# Patient Record
Sex: MALE | Race: WHITE | Employment: FULL TIME | URBAN - METROPOLITAN AREA
[De-identification: names, ages, dates, MRNs, and addresses within clinical notes are randomized per-mention and may not be internally consistent; named-entity substitution may affect disease eponyms.]

---

## 2018-02-07 ENCOUNTER — HOSPITAL ENCOUNTER (OUTPATIENT)
Dept: PHYSICAL THERAPY | Age: 63
Discharge: HOME | End: 2018-02-07
Payer: OTHER GOVERNMENT

## 2018-02-07 PROCEDURE — 97161 PT EVAL LOW COMPLEX 20 MIN: CPT | Performed by: PHYSICAL THERAPIST

## 2018-02-07 NOTE — PROGRESS NOTES
[facility] PHYSICAL THERAPY - DAILY TREATMENT NOTE    Patient Name: Barby Phelps        Date: 2018  : 1955   yes Patient  Verified  Visit #:     Insurance: Payor: Lidia Mcneil / Plan: Carry Jose Maeryanfatemeh [employer] PPO / Product Type: Commerical /      In time: 320 Out time: 355   Total Treatment Time: 35     Medicare Time Tracking (below)   Total Timed Codes (min):  ba 1:1 Treatment Time:  ba     TREATMENT AREA =  Left shoulder pain [M25.512]    SUBJECTIVE  Pain Level (on 0 to 10 scale):  ie  / 10   Medication Changes/New allergies or changes in medical history, any new surgeries or procedures?    no  If yes, update Summary List   Subjective Functional Status/Changes:  []  No changes reported     See ie          OBJECTIVE           min Patient Education:  yes  Reviewed HEP   []  Progressed/Changed HEP based on: Other Objective/Functional Measures:    See ie     Post Treatment Pain Level (on 0 to 10) scale:   ie  / 10     ASSESSMENT  Assessment/Changes in Function:     See ie     []  See Progress Note/Recertification   Patient will continue to benefit from skilled PT services to modify and progress therapeutic interventions, address functional mobility deficits, address ROM deficits, address strength deficits, analyze and address soft tissue restrictions, analyze and cue movement patterns, analyze and modify body mechanics/ergonomics, assess and modify postural abnormalities and instruct in home and community integration to attain remaining goals. Progress toward goals / Updated goals:    See ie     PLAN  []  Upgrade activities as tolerated yes Continue plan of care   []  Discharge due to :    []  Other:      Therapist: Bryant Sebastian, PT    Date: 2018 Time: 3:55 PM     No future appointments.

## 2018-02-07 NOTE — PROGRESS NOTES
[facility] PHYSICAL THERAPY  [address] [place] [address], [address] [facility] - Phone: [phone]  Fax: [phone] / 0049 [facility]  Patient Name: Anusha Lewis : 1955   Medical   Diagnosis: L shoulder impingment Treatment Diagnosis: Left shoulder pain [M25.512]   Onset Date:      Referral Source: Shantelle Harris MD Start of Care [facility] [place]): 2018   Prior Hospitalization: See medical history Provider #: [id number]   Prior Level of Function: Progressive limitation in shoulder use   Comorbidities: High blood pressure   Medications: Verified on Patient Summary List   The Plan of Care and following information is based on the information from the initial evaluation.   ===========================================================================================  Assessment / key information:  65 RHD M arrives to clinic with c/o L anterior/lateral shoulder pain after lifting a car transmission in early . Previous treatment has included NSAIDs, PT and 3 cortisone injections over past year (most recent improved s/s). Currently rates pain 8/10 at worst with overhead motions and 1/10 at best with rest. Objective findings: cervical screen +UT and LS tightness as well as elevation L first rib, shoulder arom/prom flex 108P1/122P!, abd 88P!/110P!, er 9/12P!, IR L5 spinal segment, reduced wrist supination by 45 degrees, +full can, speeds, cross over and post cap tightness. Strength is reduced in all directions. ttp throughout SAS, LHB and posterior capsule.  S/s consistent with shoulder impingement and will attempt PT in order to address problem list below.   ===========================================================================================  Eval Complexity: History MEDIUM  Complexity : 1-2 comorbidities / personal factors will impact the outcome/ POC ;  Examination  HIGH Complexity : 4+ Standardized tests and measures addressing body structure, function, activity limitation and / or participation in recreation ; Presentation LOW Complexity : Stable, uncomplicated ;  Decision Making MEDIUM Complexity : FOTO score of 26-74; Overall Complexity LOW   Problem List: pain affecting function, decrease ROM, decrease strength, decrease ADL/ functional abilitiies, decrease activity tolerance, decrease flexibility/ joint mobility and decrease transfer abilities   Treatment Plan may include any combination of the following: Therapeutic exercise, Therapeutic activities, Neuromuscular re-education, Physical agent/modality, Manual therapy, Patient education and Self Care training  Patient / Family readiness to learn indicated by: asking questions, trying to perform skills and interest  Persons(s) to be included in education: patient (P)  Barriers to Learning/Limitations: None  Measures taken:    Patient Goal (s): Decrease pain, restore use of shoulder    Patient self reported health status: good  Rehabilitation Potential: good   Short Term Goals: To be accomplished in  2  weeks:  1. Pt will be compliant with hep  2. Pt will increase shoulder er by 10 degrees to A with reaching  3. Pt will note daily max pain </=7/10 in order to increase participation in [address]: To be accomplished in  4  weeks:  1. Pt will increase shoulder elevation to 120 (without compensation) to A with reaching  2. Pt will increase FOTO by 12 points in order to show functional improvement  3.  Pt will note daily max pain </=4/10 in order to increase participation in adls  Frequency / Duration:   Patient to be seen  2-3  times per week for 4  weeks:  Patient / Caregiver education and instruction: self care and activity modification  G-Codes (GP): johnnie  Therapist Signature: Angi Arguelles PT, DPT, CMT  Date: 4/0/7252   Certification Period: johnnie Time: 3:56 PM ===========================================================================================  I certify that the above Physical Therapy Services are being furnished while the patient is under my care. I agree with the treatment plan and certify that this therapy is necessary. Physician Signature:        Date:       Time:     Please sign and return to [facility]. or you may fax the signed copy to [phone]. Thank you.

## 2018-02-09 ENCOUNTER — APPOINTMENT (OUTPATIENT)
Dept: PHYSICAL THERAPY | Age: 63
End: 2018-02-09
Payer: OTHER GOVERNMENT

## 2018-02-13 ENCOUNTER — HOSPITAL ENCOUNTER (OUTPATIENT)
Dept: PHYSICAL THERAPY | Age: 63
Discharge: HOME | End: 2018-02-13
Payer: OTHER GOVERNMENT

## 2018-02-13 PROCEDURE — 97110 THERAPEUTIC EXERCISES: CPT | Performed by: PHYSICAL THERAPIST

## 2018-02-13 PROCEDURE — 97140 MANUAL THERAPY 1/> REGIONS: CPT | Performed by: PHYSICAL THERAPIST

## 2018-02-13 NOTE — PROGRESS NOTES
[facility] PHYSICAL THERAPY - DAILY TREATMENT NOTE    Patient Name: Severa Landry        Date: 2018  : 1955   yes Patient  Verified  Visit #:     Insurance: Payor: Joseaileen Reyez / Plan: Carol Inks / Product Type: Commerical /      In time: 200 Out time: 300   Total Treatment Time: 55     Medicare Time Tracking (below)   Total Timed Codes (min):  na 1:1 Treatment Time:  na     TREATMENT AREA =  Left shoulder pain [M25.512]    SUBJECTIVE  Pain Level (on 0 to 10 scale):    / 10   Medication Changes/New allergies or changes in medical history, any new surgeries or procedures?    no  If yes, update Summary List   Subjective Functional Status/Changes:  []  No changes reported     I was sore for 5 days after the IE.           OBJECTIVE  Modalities Rationale:     decrease inflammation, decrease pain and increase tissue extensibility to improve patient's ability to complete adls   min [] Estim, type/location:                                      []  att     []  unatt     []  w/US     []  w/ice    []  w/heat    min []  Mechanical Traction: type/lbs                   []  pro   []  sup   []  int   []  cont    []  before manual    []  after manual    min []  Ultrasound, settings/location:      min []  Iontophoresis w/ dexamethasone, location:                                               []  take home patch       []  in clinic   10 min [x]  Ice     [x]  Heat    location/position: Long sit    min []  Vasopneumatic Device, press/temp:     min []  Other:    [x] Skin assessment post-treatment (if applicable):    [x]  intact    []  redness- no adverse reaction     []redness  adverse reaction:        15 min Therapeutic Exercise:  [x]  See flow sheet   Rationale:      increase ROM and improve coordination to improve the patients ability to complete adls     20 min Manual Therapy: ghj prom all directions, grade iii post mob, cfm/stretch lhb, dtm post cuff/ut   Rationale:      decrease pain, increase ROM, increase tissue extensibility and decrease trigger points to improve patient's ability to complete adls         min Patient Education:  yes  Reviewed HEP   []  Progressed/Changed HEP based on: Other Objective/Functional Measures:    Significant tenderness with all prom/aarom in all er and ir along anterior aspect of shoulder  Required cues for all te to maintain neutral spine - once cued able to achieve      Post Treatment Pain Level (on 0 to 10) scale:   2  / 10     ASSESSMENT  Assessment/Changes in Function:     No increase in pain following initial te but advised to increase icing based on response from IE      []  See Progress Note/Recertification   Patient will continue to benefit from skilled PT services to modify and progress therapeutic interventions, address functional mobility deficits, address ROM deficits, address strength deficits, analyze and address soft tissue restrictions, analyze and cue movement patterns, analyze and modify body mechanics/ergonomics, assess and modify postural abnormalities and instruct in home and community integration to attain remaining goals.    Progress toward goals / Updated goals:    Compliant with initial hep     PLAN  []  Upgrade activities as tolerated yes Continue plan of care   []  Discharge due to :    []  Other:      Therapist: Neno Serrato PT    Date: 2/13/2018 Time: 11:39 AM     Future Appointments  Date Time Provider Shavonne Mortensen   2/13/2018 2:30 PM Neno Serrato PT [facility]   2/15/2018 9:30 AM Neno Serrato PT [facility]   2/19/2018 2:30 PM Neno Serrato PT [facility]   2/21/2018 10:30 AM Neno Serrato PT [facility]   3/2/2018 9:00 AM Neno Serrato PT [facility]   3/5/2018 3:00 PM Neno Serrato PT [facility]   3/7/2018 3:00 PM Neno Serrato PT [facility]   3/9/2018 11:00 AM Neno Srerato PT [facility]

## 2018-02-15 ENCOUNTER — HOSPITAL ENCOUNTER (OUTPATIENT)
Dept: PHYSICAL THERAPY | Age: 63
Discharge: HOME | End: 2018-02-15
Payer: OTHER GOVERNMENT

## 2018-02-15 PROCEDURE — 97110 THERAPEUTIC EXERCISES: CPT | Performed by: PHYSICAL THERAPIST

## 2018-02-15 PROCEDURE — 97140 MANUAL THERAPY 1/> REGIONS: CPT | Performed by: PHYSICAL THERAPIST

## 2018-02-15 NOTE — PROGRESS NOTES
[facility]ario [facility]   PHYSICAL THERAPY - DAILY TREATMENT NOTE    Patient Name: Baljinder Power        Date: 2/15/2018  : 1955   yes Patient  Verified  Visit #:   3   of   12  Insurance: Payor: Odalis Aguirre / Plan: Christina Mosquera / Product Type: Commerical /      In time: 124 Out time: 1020   Total Treatment Time: 55     Medicare Time Tracking (below)   Total Timed Codes (min):  na 1:1 Treatment Time:  na     TREATMENT AREA =  Left shoulder pain [M25.512]    SUBJECTIVE  Pain Level (on 0 to 10 scale):  2  / 10   Medication Changes/New allergies or changes in medical history, any new surgeries or procedures?    no  If yes, update Summary List   Subjective Functional Status/Changes:  []  No changes reported     I was not too sore after last time much better than the IE        OBJECTIVE  Modalities Rationale:     decrease inflammation, decrease pain and increase tissue extensibility to improve patient's ability to complete adls   min [] Estim, type/location:                                      []  att     []  unatt     []  w/US     []  w/ice    []  w/heat    min []  Mechanical Traction: type/lbs                   []  pro   []  sup   []  int   []  cont    []  before manual    []  after manual    min []  Ultrasound, settings/location:      min []  Iontophoresis w/ dexamethasone, location:                                               []  take home patch       []  in clinic   10 min [x]  Ice     [x]  Heat    location/position: Long sit    min []  Vasopneumatic Device, press/temp:     min []  Other:    [x] Skin assessment post-treatment (if applicable):    [x]  intact    []  redness- no adverse reaction     []redness  adverse reaction:        13 min Therapeutic Exercise:  [x]  See flow sheet   Rationale:      increase ROM and improve coordination to improve the patients ability to complete adls     22 min Manual Therapy: ghj prom all directions, grade iii post mob, cfm/stretch lhb, dtm post cuff and pec major   Rationale:      decrease pain, increase ROM, increase tissue extensibility and decrease trigger points to improve patient's ability to complete adls         min Patient Education:  yes  Reviewed HEP   []  Progressed/Changed HEP based on: Other Objective/Functional Measures:  Er prom to 30 degrees before painful end feel, decreased soft tissue restrictions along biceps tendon with ability to achieve full wrist supination during stretch     Post Treatment Pain Level (on 0 to 10) scale:   1  / 10     ASSESSMENT  Assessment/Changes in Function:   Reaching > shoulder height is still pt chief c/o - pain is anterior aspect of shoulder and increases with repetitive use       []  See Progress Note/Recertification   Patient will continue to benefit from skilled PT services to modify and progress therapeutic interventions, address functional mobility deficits, address ROM deficits, address strength deficits, analyze and address soft tissue restrictions, analyze and cue movement patterns, analyze and modify body mechanics/ergonomics, assess and modify postural abnormalities and instruct in home and community integration to attain remaining goals.    Progress toward goals / Updated goals:  Progress towards er stg        PLAN  []  Upgrade activities as tolerated yes Continue plan of care   []  Discharge due to :    []  Other:      Therapist: Bam Lora PT    Date: 2/15/2018 Time: 11:39 AM     Future Appointments  Date Time Provider Shavonne Mortensen   2/15/2018 9:30 AM Bam Lora PT [facility]   2/19/2018 2:30 PM Bam Lora PT [facility]   2/21/2018 10:30 AM Bam Lora PT [facility]   3/2/2018 9:00 AM Bam Lora PT [facility]   3/5/2018 3:00 PM Bam Lora PT [facility]   3/7/2018 3:00 PM Bam Lora PT [facility]   3/9/2018 11:00 AM Bam Lora PT [facility]

## 2018-02-19 ENCOUNTER — HOSPITAL ENCOUNTER (OUTPATIENT)
Dept: PHYSICAL THERAPY | Age: 63
Discharge: HOME | End: 2018-02-19
Payer: OTHER GOVERNMENT

## 2018-02-19 PROCEDURE — 97110 THERAPEUTIC EXERCISES: CPT | Performed by: PHYSICAL THERAPIST

## 2018-02-19 PROCEDURE — 97140 MANUAL THERAPY 1/> REGIONS: CPT | Performed by: PHYSICAL THERAPIST

## 2018-02-19 NOTE — PROGRESS NOTES
[facility] PHYSICAL THERAPY - DAILY TREATMENT NOTE    Patient Name: Baljinder Power        Date: 2018  : 1955   yes Patient  Verified  Visit #:     Insurance: Payor: Odalis Aguirre / Plan: Christina Mosquera / Product Type: Commerical /      In time: 219 Out time: 323   Total Treatment Time: 60     Medicare Time Tracking (below)   Total Timed Codes (min):  na 1:1 Treatment Time:  na     TREATMENT AREA =  Left shoulder pain [M25.512]    SUBJECTIVE  Pain Level (on 0 to 10 scale):  2  / 10   Medication Changes/New allergies or changes in medical history, any new surgeries or procedures?    no  If yes, update Summary List   Subjective Functional Status/Changes:  []  No changes reported     The pain is pretty much close to nothing.  I feel like my biggest limitation now is my mobility      OBJECTIVE  Modalities Rationale:     decrease inflammation, decrease pain and increase tissue extensibility to improve patient's ability to complete adls   min [] Estim, type/location:                                      []  att     []  unatt     []  w/US     []  w/ice    []  w/heat    min []  Mechanical Traction: type/lbs                   []  pro   []  sup   []  int   []  cont    []  before manual    []  after manual    min []  Ultrasound, settings/location:      min []  Iontophoresis w/ dexamethasone, location:                                               []  take home patch       []  in clinic   10 min [x]  Ice     [x]  Heat    location/position: Long sit    min []  Vasopneumatic Device, press/temp:     min []  Other:    [x] Skin assessment post-treatment (if applicable):    [x]  intact    []  redness- no adverse reaction     []redness  adverse reaction:        18 min Therapeutic Exercise:  [x]  See flow sheet   Rationale:      increase ROM and improve coordination to improve the patients ability to complete adls     22 min Manual Therapy: ghj prom all directions, grade iii post mob, cfm/stretch lhb, dtm post cuff and pec major Rationale:      decrease pain, increase ROM, increase tissue extensibility and decrease trigger points to improve patient's ability to complete adls         min Patient Education:  yes  Reviewed HEP   []  Progressed/Changed HEP based on: Other Objective/Functional Measures:  Ir t12 spinal level, flex 112 without hike,   Improved lhb extensibility with pt able to achieve ull elbow ext and wrist pronation with stretch     Post Treatment Pain Level (on 0 to 10) scale:   0  / 10     ASSESSMENT  Assessment/Changes in Function:     Progressed te without any increase in pain - pt advised on DOMS and to continue with ice accordingly      []  See Progress Note/Recertification   Patient will continue to benefit from skilled PT services to modify and progress therapeutic interventions, address functional mobility deficits, address ROM deficits, address strength deficits, analyze and address soft tissue restrictions, analyze and cue movement patterns, analyze and modify body mechanics/ergonomics, assess and modify postural abnormalities and instruct in home and community integration to attain remaining goals. Progress toward goals / Updated goals:  Steady gains towards rom and pain goals.  Will perform functional scale next session in order to assess progress towards established goal      PLAN  []  Upgrade activities as tolerated yes Continue plan of care   []  Discharge due to :    []  Other:      Therapist: Bianca Masterson PT    Date: 2/19/2018 Time: 11:39 AM     Future Appointments  Date Time Provider Shavonne Mortensen   2/19/2018 2:30 PM Bianca Masterson PT [facility]   2/21/2018 10:30 AM Bianca Masterson PT [facility]   3/2/2018 9:00 AM Bianca Masterson PT [facility]   3/5/2018 3:00 PM Bianca Masterson PT [facility]   3/7/2018 3:00 PM Bianca Masterson PT [facility]   3/9/2018 11:00 AM Bianca Masterson PT [address]

## 2018-02-21 ENCOUNTER — HOSPITAL ENCOUNTER (OUTPATIENT)
Dept: PHYSICAL THERAPY | Age: 63
Discharge: HOME | End: 2018-02-21
Payer: OTHER GOVERNMENT

## 2018-02-21 PROCEDURE — 97140 MANUAL THERAPY 1/> REGIONS: CPT | Performed by: PHYSICAL THERAPIST

## 2018-02-21 PROCEDURE — 97110 THERAPEUTIC EXERCISES: CPT | Performed by: PHYSICAL THERAPIST

## 2018-02-21 NOTE — PROGRESS NOTES
[facility] PHYSICAL THERAPY - DAILY TREATMENT NOTE    Patient Name: Ankit Sanders        Date: 2018  : 1955   yes Patient  Verified  Visit #:     Insurance: Payor: Abel Plaza / Plan: Tiffanie Marvin / Product Type: Commerical /      In time:  Out time: 110   Total Treatment Time: 50     Medicare Time Tracking (below)   Total Timed Codes (min):  na 1:1 Treatment Time:  na     TREATMENT AREA =  Left shoulder pain [M25.512]    SUBJECTIVE  Pain Level (on 0 to 10 scale):  0  / 10   Medication Changes/New allergies or changes in medical history, any new surgeries or procedures?    no  If yes, update Summary List   Subjective Functional Status/Changes:  []  No changes reported     See pn     OBJECTIVE      30 min Therapeutic Exercise:  [x]  See flow sheet   Rationale:      increase ROM and improve coordination to improve the patients ability to complete adls     20 min Manual Therapy: ghj prom all directions, grade iii post mob, cfm/stretch lhb, dtm post cuff and pec major   Rationale:      decrease pain, increase ROM, increase tissue extensibility and decrease trigger points to improve patient's ability to complete adls         min Patient Education:  yes  Reviewed HEP   []  Progressed/Changed HEP based on: Other Objective/Functional Measures:  See pn     Post Treatment Pain Level (on 0 to 10) scale:   0  / 10     ASSESSMENT  Assessment/Changes in Function:     See pn     []  See Progress Note/Recertification   Patient will continue to benefit from skilled PT services to modify and progress therapeutic interventions, address functional mobility deficits, address ROM deficits, address strength deficits, analyze and address soft tissue restrictions, analyze and cue movement patterns, analyze and modify body mechanics/ergonomics, assess and modify postural abnormalities and instruct in home and community integration to attain remaining goals.    Progress toward goals / Updated goals:  See pn     PLAN  [] Upgrade activities as tolerated yes Continue plan of care   []  Discharge due to :    []  Other:      Therapist: Paul Segura PT    Date: 2/21/2018 Time: 11:39 AM     Future Appointments  Date Time Provider Shavonne Mortensen   3/2/2018 9:00 AM Paul Segura PT [address]   3/5/2018 3:00 PM Paul Segura, PT [place] [address]   3/7/2018 3:00 PM Paul Segura PT [address]   3/9/2018 11:00 AM Paul Segura, PT [facility]

## 2018-02-21 NOTE — PROGRESS NOTES
.[facility]A [facility]  PHYSICAL THERAPY  [address], [place] [address],[facility], [address] [facility] - Phone: [phone]  Fax: [phone]  PROGRESS NOTE  Patient Name: Remington Izquierdo : 1955   Treatment/Medical Diagnosis: Left shoulder pain [M25.512]   Referral Source: Nakia Sparks MD     Date of Initial Visit: 18 Attended Visits: 5 Missed Visits: 0     SUMMARY OF TREATMENT  Pt was seen for IE and 4 f/u sessions with treatment consisting of therapeutic exercises for shoulder rom/strength and roxy-scapular stability, manual therapy (prom/mobs/stm) and modalities prn. In addition pt was instructed on hep. CURRENT STATUS  Pt has made excellent progress with PT in just 5 sessions. Shoulder arom flex 142 (without hike), scaption 150, er 31 and IR l1 spinal segment. Max \"pain\" 2/10 along posterior lateral aspect of shoulder with overhead use. At this time strength and rotational adls are pt chief limitation. Would like to continue with therapy an additional month to address this    Goal/Measure of Progress Goal Met? 1. Pt will increase shoulder elevation >120 to A with reaching   Status at last Eval: Flex 108, scaption 122 Current Status: See above yes   2. Pt will increase FOTO by 12 points in order to show functional improvement   Status at last Eval: 47/100 Current Status: 55/100 progressing   3. Pt will decrease daily max pain </=4/10 in order to increase participation in adls   Status at last Eval: 8/10 Current Status: 2/10 yes     New Goals to be achieved in __4__  weeks:  1. Achieve goal #1  2. Achieve goal #2  3. Pt will increase shoulder er >45 degrees to A with reaching   RECOMMENDATIONS  Continue therapy an additional 2x/4weeks  If you have any questions/comments please contact us directly at [phone]. Thank you for allowing us to assist in the care of your patient.     Therapist Signature: Balbina Vail, PT, DPT, CMT Date: 2018     Time: 1:05 PM   NOTE TO PHYSICIAN:  PLEASE COMPLETE THE ORDERS BELOW AND FAX TO   [facility] Physical Therapy: [phone]  If you are unable to process this request in 24 hours please contact our office: [phone]    ___ I have read the above report and request that my patient continue as recommended.   ___ I have read the above report and request that my patient continue therapy with the following changes/special instructions:_________________________________________________________   ___ I have read the above report and request that my patient be discharged from therapy.      Physician Signature:        Date:       Time:

## 2018-03-02 ENCOUNTER — HOSPITAL ENCOUNTER (OUTPATIENT)
Dept: PHYSICAL THERAPY | Age: 63
Discharge: HOME | End: 2018-03-02
Payer: OTHER GOVERNMENT

## 2018-03-02 PROCEDURE — 97140 MANUAL THERAPY 1/> REGIONS: CPT | Performed by: PHYSICAL THERAPIST

## 2018-03-02 PROCEDURE — 97110 THERAPEUTIC EXERCISES: CPT | Performed by: PHYSICAL THERAPIST

## 2018-03-02 NOTE — PROGRESS NOTES
[facility] PHYSICAL THERAPY - DAILY TREATMENT NOTE    Patient Name: Janell Ochoa        Date: 3/2/2018  : 1955   yes Patient  Verified  Visit #:     Insurance: Payor: María Vital / Plan: Earle Paiz / Product Type: Commerical /      In time: 900 Out time: 375   Total Treatment Time: 42     Medicare Time Tracking (below)   Total Timed Codes (min):  na 1:1 Treatment Time:  na     TREATMENT AREA =  Left shoulder pain [M25.512]    SUBJECTIVE  Pain Level (on 0 to 10 scale):  0  / 10   Medication Changes/New allergies or changes in medical history, any new surgeries or procedures?    no  If yes, update Summary List   Subjective Functional Status/Changes:  []  No changes reported   I am feeling awesome - went on my vacation and just about the only thing I really could not do was lift some heavy things     OBJECTIVE      25 min Therapeutic Exercise:  [x]  See flow sheet   Rationale:      increase ROM and improve coordination to improve the patients ability to complete adls     17 min Manual Therapy: ghj prom all directions, grade iii post mob, cfm/stretch lhb, dtm post cuff and pec major   Rationale:      decrease pain, increase ROM, increase tissue extensibility and decrease trigger points to improve patient's ability to complete adls         min Patient Education:  yes  Reviewed HEP   []  Progressed/Changed HEP based on:         Other Objective/Functional Measures:  Minimal tenderness and restrictions along LHB, still continues with reduced posterior capsular mobility  Required cues for prone y/t for scapular activation      Post Treatment Pain Level (on 0 to 10) scale:   0  / 10     ASSESSMENT  Assessment/Changes in Function:     No increase in pain reported with progression of program      []  See Progress Note/Recertification   Patient will continue to benefit from skilled PT services to modify and progress therapeutic interventions, address functional mobility deficits, address ROM deficits, address strength deficits, analyze and address soft tissue restrictions, analyze and cue movement patterns, analyze and modify body mechanics/ergonomics, assess and modify postural abnormalities and instruct in home and community integration to attain remaining goals. Progress toward goals / Updated goals: Max pain during vacation 1/10 with swimming.  Chief c/o at this time is inability to lift any objects overhead or out to side      PLAN  []  Upgrade activities as tolerated yes Continue plan of care   []  Discharge due to :    []  Other:      Therapist: Bishop Rinne, PT    Date: 3/2/2018 Time: 11:39 AM     Future Appointments  Date Time Provider Shavonne Mortensen   3/5/2018 3:00 PM Bishop Rinne, PT [facility]   3/7/2018 3:00 PM Bishop Rinne, PT [facility]   3/9/2018 11:00 AM Bishop Rinne, PT [facility]

## 2018-03-05 ENCOUNTER — APPOINTMENT (OUTPATIENT)
Dept: PHYSICAL THERAPY | Age: 63
End: 2018-03-05
Payer: OTHER GOVERNMENT

## 2018-03-07 ENCOUNTER — HOSPITAL ENCOUNTER (OUTPATIENT)
Dept: PHYSICAL THERAPY | Age: 63
Discharge: HOME | End: 2018-03-07
Payer: OTHER GOVERNMENT

## 2018-03-07 PROCEDURE — 97110 THERAPEUTIC EXERCISES: CPT | Performed by: PHYSICAL THERAPIST

## 2018-03-07 PROCEDURE — 97140 MANUAL THERAPY 1/> REGIONS: CPT | Performed by: PHYSICAL THERAPIST

## 2018-03-07 NOTE — PROGRESS NOTES
[facility] PHYSICAL THERAPY - DAILY TREATMENT NOTE    Patient Name: Olivier Vidal        Date: 3/7/2018  : 1955   yes Patient  Verified  Visit #:     Insurance: Payor: Casimir Fleischer / Plan: William Boyle / Product Type: Commerical /      In time: 303 Out time: 358   Total Treatment Time: 54     Medicare Time Tracking (below)   Total Timed Codes (min):  na 1:1 Treatment Time:  na     TREATMENT AREA =  Left shoulder pain [M25.512]    SUBJECTIVE  Pain Level (on 0 to 10 scale):  0  / 10   Medication Changes/New allergies or changes in medical history, any new surgeries or procedures?    no  If yes, update Summary List   Subjective Functional Status/Changes:  []  No changes reported   Only time I have pain is this sweet spot in the front of my shoulder when I reach in a certain direction - after that it goes way      OBJECTIVE      30 min Therapeutic Exercise:  [x]  See flow sheet   Rationale:      increase ROM and improve coordination to improve the patients ability to complete adls     17 min Manual Therapy: ghj prom all directions, grade iii post mob, cfm/stretch lhb, dtm post cuff and pec major   Rationale:      decrease pain, increase ROM, increase tissue extensibility and decrease trigger points to improve patient's ability to complete adls         min Patient Education:  yes  Reviewed HEP   []  Progressed/Changed HEP based on: Other Objective/Functional Measures:     ttp along anterior aspect of shoulder along lhb.  Still continues with painful arc - increased shoulder stab te as per flow sheet to improve humeral head stabilization during motion   Post Treatment Pain Level (on 0 to 10) scale:   1  / 10     ASSESSMENT  Assessment/Changes in Function:   Reported slight increase in pain following exercise progression - had to leave early but advised to increase icing frequency this evening     []  See Progress Note/Recertification   Patient will continue to benefit from skilled PT services to modify and progress therapeutic interventions, address functional mobility deficits, address ROM deficits, address strength deficits, analyze and address soft tissue restrictions, analyze and cue movement patterns, analyze and modify body mechanics/ergonomics, assess and modify postural abnormalities and instruct in home and community integration to attain remaining goals.    Progress toward goals / Updated goals:  ltg # 3 achieved this session - will assess carry over next session     PLAN  []  Upgrade activities as tolerated yes Continue plan of care   []  Discharge due to :    []  Other:      Therapist: Maria Isabel Eden PT    Date: 3/7/2018 Time: 11:39 AM     Future Appointments  Date Time Provider Shavonne Mortensen   3/7/2018 3:00 PM CASSANDRA Alvarez [facility]   3/9/2018 11:00 AM Maria Isabel Eden PT [facility]

## 2018-03-09 ENCOUNTER — APPOINTMENT (OUTPATIENT)
Dept: PHYSICAL THERAPY | Age: 63
End: 2018-03-09
Payer: OTHER GOVERNMENT

## 2018-04-04 ENCOUNTER — HOSPITAL ENCOUNTER (OUTPATIENT)
Dept: PHYSICAL THERAPY | Age: 63
Discharge: HOME | End: 2018-04-04
Payer: OTHER GOVERNMENT

## 2018-04-04 PROCEDURE — 97140 MANUAL THERAPY 1/> REGIONS: CPT | Performed by: PHYSICAL THERAPIST

## 2018-04-04 PROCEDURE — 97110 THERAPEUTIC EXERCISES: CPT | Performed by: PHYSICAL THERAPIST

## 2018-04-04 NOTE — PROGRESS NOTES
.DALILA [facility]errin  PHYSICAL THERAPY  [address], [place] [address],[facility], [address] [facility] - Phone: [phone]  Fax: [phone]  PROGRESS NOTE  Patient Name: Jhonny Ragsdale : 1955   Treatment/Medical Diagnosis: Left shoulder pain [M25.512]   Referral Source: Herman Morrison MD     Date of Initial Visit: 18 Attended Visits: 8 Missed Visits: See below     SUMMARY OF TREATMENT  Pt was seen for IE and 7 f/u sessions with treatment consisting of therapeutic exercises for shoulder rom/strength and roxy-scapular stability, manual therapy (prom/mobs/stm) and modalities prn. In addition pt was instructed on hep. CURRENT STATUS  Pt has not been seen since 3/7/18. He has returned after f/u from your office requesting more visits to improve strength/endurance. During this time period he has been participating in adls such as lifting and pain with 2/10 max pain. Shoulder arom flex 163 (without hike), scaption 150, er 51 and IR t11 spinal segment. He is still unable to sustain shoulder at 90 degrees and above without c/o fatigue. . Would like to continue with therapy an additional month to address this    Goal/Measure of Progress Goal Met? 1. Pt will increase shoulder elevation WNL  to A with reaching   Status at last Eval: flex 142 (without hike), scaption 150 Current Status: See above yes   2. Pt will increase FOTO by 12 points in order to show functional improvement   Status at last Eval: 55/100 Current Status: Pt did not fill out na   3. Pt will increase ER >45 degrees to A with reaching   Status at last Eval: 31 Current Status: 51 yes     New Goals to be achieved in __4__  weeks:  1. Achieve goal #2  2. Pt will be able to lift >/=2# overhead in order to increase participation in adls  3.  Pt will be able to self manage s/s in order to prepare for dc   RECOMMENDATIONS  Continue therapy an additional 2x/4weeks  If you have any questions/comments please contact us directly at [phone]. Thank you for allowing us to assist in the care of your patient. Therapist Signature: Ankur Shin, PT, DPT, CMT Date: 4/4/2018     Time: 1:05 PM   NOTE TO PHYSICIAN:  PLEASE COMPLETE THE ORDERS BELOW AND FAX TO   [facility]: [phone]  If you are unable to process this request in 24 hours please contact our office: [phone]    ___ I have read the above report and request that my patient continue as recommended.   ___ I have read the above report and request that my patient continue therapy with the following changes/special instructions:_________________________________________________________   ___ I have read the above report and request that my patient be discharged from therapy.      Physician Signature:        Date:       Time:

## 2018-04-04 NOTE — PROGRESS NOTES
[facility] PHYSICAL THERAPY - DAILY TREATMENT NOTE    Patient Name: Janis Montgomery        Date: 2018  : 1955   yes Patient  Verified  Visit #:     Insurance: Payor: Monae Irby / Plan: Isadora Voxa / Product Type: Commerical /      In time: 243 Out time: 337   Total Treatment Time: 50     Medicare Time Tracking (below)   Total Timed Codes (min):  na 1:1 Treatment Time:  na     TREATMENT AREA =  Left shoulder pain [M25.512]    SUBJECTIVE  Pain Level (on 0 to 10 scale): 4  / 10   Medication Changes/New allergies or changes in medical history, any new surgeries or procedures?    no  If yes, update Summary List   Subjective Functional Status/Changes:  []  No changes reported   SEE PN     OBJECTIVE      30 min Therapeutic Exercise:  [x]  See flow sheet   Rationale:      increase ROM and improve coordination to improve the patients ability to complete adls     20 min Manual Therapy: ghj prom all directions, grade iii post mob, cfm/stretch lhb, dtm post cuff and pec major   Rationale:      decrease pain, increase ROM, increase tissue extensibility and decrease trigger points to improve patient's ability to complete adls         min Patient Education:  yes  Reviewed HEP   []  Progressed/Changed HEP based on: Other Objective/Functional Measures:     SEE PN   Post Treatment Pain Level (on 0 to 10) scale:   1  / 10     ASSESSMENT  Assessment/Changes in Function:   SEE PN     []  See Progress Note/Recertification   Patient will continue to benefit from skilled PT services to modify and progress therapeutic interventions, address functional mobility deficits, address ROM deficits, address strength deficits, analyze and address soft tissue restrictions, analyze and cue movement patterns, analyze and modify body mechanics/ergonomics, assess and modify postural abnormalities and instruct in home and community integration to attain remaining goals.    Progress toward goals / Updated goals:  SEE PN     PLAN  []  Upgrade activities as tolerated yes Continue plan of care   []  Discharge due to :    []  Other:      Therapist: Vanda López PT    Date: 4/4/2018 Time: 11:39 AM     No future appointments.

## 2018-04-09 ENCOUNTER — HOSPITAL ENCOUNTER (OUTPATIENT)
Dept: PHYSICAL THERAPY | Age: 63
Discharge: HOME | End: 2018-04-09
Payer: OTHER GOVERNMENT

## 2018-04-09 PROCEDURE — 97140 MANUAL THERAPY 1/> REGIONS: CPT | Performed by: PHYSICAL THERAPIST

## 2018-04-09 PROCEDURE — 97110 THERAPEUTIC EXERCISES: CPT | Performed by: PHYSICAL THERAPIST

## 2018-04-11 ENCOUNTER — HOSPITAL ENCOUNTER (OUTPATIENT)
Dept: PHYSICAL THERAPY | Age: 63
Discharge: HOME | End: 2018-04-11
Payer: OTHER GOVERNMENT

## 2018-04-11 PROCEDURE — 97110 THERAPEUTIC EXERCISES: CPT | Performed by: PHYSICAL THERAPIST

## 2018-04-11 PROCEDURE — 97140 MANUAL THERAPY 1/> REGIONS: CPT | Performed by: PHYSICAL THERAPIST

## 2018-04-17 ENCOUNTER — HOSPITAL ENCOUNTER (OUTPATIENT)
Dept: PHYSICAL THERAPY | Age: 63
Discharge: HOME | End: 2018-04-17
Payer: OTHER GOVERNMENT

## 2018-04-17 PROCEDURE — 97110 THERAPEUTIC EXERCISES: CPT | Performed by: PHYSICAL THERAPIST

## 2018-04-17 PROCEDURE — 97140 MANUAL THERAPY 1/> REGIONS: CPT | Performed by: PHYSICAL THERAPIST

## 2018-04-17 NOTE — PROGRESS NOTES
[facility] PHYSICAL THERAPY - DAILY TREATMENT NOTE    Patient Name: Norma Polo        Date: 2018  : 1955   yes Patient  Verified  Visit #:     Insurance: Payor: Lopez Feng / Plan: Peggy Villa / Product Type: Commerical /      In time: 238 Out time: 320   Total Treatment Time: 40     Medicare Time Tracking (below)   Total Timed Codes (min):  na 1:1 Treatment Time:  na     TREATMENT AREA =  Left shoulder pain [M25.512]    SUBJECTIVE  Pain Level (on 0 to 10 scale): 0  / 10   Medication Changes/New allergies or changes in medical history, any new surgeries or procedures?    no  If yes, update Summary List   Subjective Functional Status/Changes:  []  No changes reported   Only place it really bothers me is when I reach up and out and its in thre front. It does not bother me along the outside part anymore      OBJECTIVE      25 min Therapeutic Exercise:  [x]  See flow sheet   Rationale:      increase ROM and improve coordination to improve the patients ability to complete adls     15 min Manual Therapy: ghj prom all directions, grade iii post mob, cfm/stretch lhb, dtm post cuff and pec major   Rationale:      decrease pain, increase ROM, increase tissue extensibility and decrease trigger points to improve patient's ability to complete adls         min Patient Education:  yes  Reviewed HEP   []  Progressed/Changed HEP based on: Other Objective/Functional Measures:  Continues to have painful and limited flexion with er and tight pec major and minor. Pain is localized to anterior sas.  -mckeon, empty can and speeds test. Repetitive overhead motion reproduces pain consistently  Added in te as per flow sheet to address above findings without co      Post Treatment Pain Level (on 0 to 10) scale:   1  / 10     ASSESSMENT  Assessment/Changes in Function:   Still continues to have limited ability to person sustained adls overhead.      []  See Progress Note/Recertification   Patient will continue to benefit from skilled PT services to modify and progress therapeutic interventions, address functional mobility deficits, address ROM deficits, address strength deficits, analyze and address soft tissue restrictions, analyze and cue movement patterns, analyze and modify body mechanics/ergonomics, assess and modify postural abnormalities and instruct in home and community integration to attain remaining goals.    Progress toward goals / Updated goals:  Pt will fill out FOTO next session in order to assess progress towards goal      PLAN  []  Upgrade activities as tolerated yes Continue plan of care   []  Discharge due to :    []  Other:      Therapist: Francesca Radford PT    Date: 4/17/2018 Time: 11:39 AM     Future Appointments  Date Time Provider Shavonne Mortensen   4/17/2018 2:30 PM Francesca Radford, PT [facility]   4/20/2018 8:30 AM Francesca Radford, PT [facility]   4/24/2018 3:00 PM Francesca Radford, PT [facility]   4/26/2018 5:00 PM [address] [place] [address]   5/1/2018 4:30 PM Francesca Radford, PT [facility]   5/3/2018 4:00 PM Francesca Radford, PT [facility]   5/8/2018 3:30 PM Francesca Radford, PT [facility]   5/10/2018 4:30 PM Francesca Radford, PT [facility]   5/14/2018 3:30 PM Francesca Radford, PT [facility]   5/16/2018 3:00 PM Francesca Radford, PT [facility]

## 2018-04-23 NOTE — PROGRESS NOTES
.[place] [id number] [address] PHYSICAL THERAPY  [address],[place], [address] [facility] [facility] [facility] [address] - Phone: [phone]  Fax: [phone]  DISCHARGE  NOTE  Patient Name: Thuy Cervantes : 1955   Treatment/Medical Diagnosis: Left shoulder pain [M25.512]   Referral Source: Patti Brady MD     Date of Initial Visit: 18 Attended Visits: 11 Missed Visits: See below     SUMMARY OF TREATMENT  Pt was seen for IE and 10 f/u sessions with treatment consisting of therapeutic exercises for shoulder rom/strength and roxy-scapular stability, manual therapy (prom/mobs/stm) and modalities prn. In addition pt was instructed on hep. CURRENT STATUS  Pt has not been seen since 3/7/18. He has returned after f/u from your office requesting more visits to improve strength/endurance. He returned for a total of 4 visits and then called to self discharge due to maximum gains with PT achieved. Reported a +7 on global rate of change scale. A very great deal better    Goal/Measure of Progress Goal Met? 1. Pt will be able to lift 2# overhead in order to increase participation in adls   Status at last Eval: flex 142 (without hike), scaption 150 Current Status: See above yes   2. Pt will increase FOTO by 12 points in order to show functional improvement   Status at last Eval: 55/100 Current Status: Pt did not fill out na   3. Pt will be able to self manage sx in order to prepare for dc   Status at last Eval:  Current Status:  yes        RECOMMENDATIONS  Pt requested self discharge   If you have any questions/comments please contact us directly at [phone]. Thank you for allowing us to assist in the care of your patient.     Therapist Signature: Mj Tobin, PT, DPT, MTC Date: 2018     Time: 1:05 PM   NOTE TO PHYSICIAN:  PLEASE COMPLETE THE ORDERS BELOW AND FAX TO   [facility]: [phone]  If you are unable to process this request in 24 hours please contact our office: [phone]    ___ I have read the above report and request that my patient continue as recommended.   ___ I have read the above report and request that my patient continue therapy with the following changes/special instructions:_________________________________________________________   ___ I have read the above report and request that my patient be discharged from therapy.      Physician Signature:        Date:       Time:

## 2018-04-24 ENCOUNTER — APPOINTMENT (OUTPATIENT)
Dept: PHYSICAL THERAPY | Age: 63
End: 2018-04-24
Payer: OTHER GOVERNMENT

## 2018-04-26 ENCOUNTER — APPOINTMENT (OUTPATIENT)
Dept: PHYSICAL THERAPY | Age: 63
End: 2018-04-26
Payer: OTHER GOVERNMENT

## 2018-05-01 ENCOUNTER — APPOINTMENT (OUTPATIENT)
Dept: PHYSICAL THERAPY | Age: 63
End: 2018-05-01

## 2018-05-03 ENCOUNTER — APPOINTMENT (OUTPATIENT)
Dept: PHYSICAL THERAPY | Age: 63
End: 2018-05-03

## 2018-05-08 ENCOUNTER — APPOINTMENT (OUTPATIENT)
Dept: PHYSICAL THERAPY | Age: 63
End: 2018-05-08

## 2018-05-10 ENCOUNTER — APPOINTMENT (OUTPATIENT)
Dept: PHYSICAL THERAPY | Age: 63
End: 2018-05-10

## 2018-05-14 ENCOUNTER — APPOINTMENT (OUTPATIENT)
Dept: PHYSICAL THERAPY | Age: 63
End: 2018-05-14

## 2018-05-16 ENCOUNTER — APPOINTMENT (OUTPATIENT)
Dept: PHYSICAL THERAPY | Age: 63
End: 2018-05-16

## 2018-08-06 ENCOUNTER — HOSPITAL ENCOUNTER (OUTPATIENT)
Dept: PHYSICAL THERAPY | Age: 63
Discharge: HOME | End: 2018-08-06
Payer: OTHER GOVERNMENT

## 2018-08-06 PROCEDURE — 97535 SELF CARE MNGMENT TRAINING: CPT | Performed by: PHYSICAL THERAPIST

## 2018-08-06 PROCEDURE — 97161 PT EVAL LOW COMPLEX 20 MIN: CPT | Performed by: PHYSICAL THERAPIST

## 2018-08-06 NOTE — PROGRESS NOTES
[facility] PHYSICAL THERAPY - DAILY TREATMENT NOTE    Patient Name: Agustin Villela        Date: 2018  : 1955   yes Patient  Verified  Visit #:   1     Insurance: Payor: Neto Mendoza / Plan: Ellsworth Harada / Product Type: Commerical /      In time: 903 Out time: 642   Total Treatment Time: 39     Medicare/BCBS Time Tracking (below)   Total Timed Codes (min):  na 1:1 Treatment Time:  na     TREATMENT AREA =  Left shoulder pain [M25.512]    SUBJECTIVE  Pain Level (on 0 to 10 scale):  3  / 10   Medication Changes/New allergies or changes in medical history, any new surgeries or procedures?    no  If yes, update Summary List   Subjective Functional Status/Changes:  []  No changes reported     See ie          OBJECTIVE  Modalities Rationale:     decrease inflammation, decrease pain and increase tissue extensibility to improve patient's ability to elevate shoulder    min [] Estim, type/location:                                      []  att     []  unatt     []  w/US     []  w/ice    []  w/heat    min []  Mechanical Traction: type/lbs                   []  pro   []  sup   []  int   []  cont    []  before manual    []  after manual    min []  Ultrasound, settings/location:      min []  Iontophoresis w/ dexamethasone, location:                                               []  take home patch       []  in clinic   10 min [x]  Ice     []  Heat    location/position: Long sit    min []  Vasopneumatic Device, press/temp:     min []  Other:    [x] Skin assessment post-treatment (if applicable):    []  intact    [x]  redness- no adverse reaction     []redness  adverse reaction:         min Patient Education:  yes  Reviewed HEP   []  Progressed/Changed HEP based on: Other Objective/Functional Measures:    SC: pt given table slides and ice for hep - will progress once pt guarding and compensation has improved.  Reviewed sleeping positions, dressing, etc ro reduce repetitive motions  See ie     Post Treatment Pain Level (on 0 to 10) scale:   4  / 10     ASSESSMENT  Assessment/Changes in Function:     See ie     []  See Progress Note/Recertification   Patient will continue to benefit from skilled PT services to modify and progress therapeutic interventions, address functional mobility deficits, address ROM deficits, address strength deficits, analyze and address soft tissue restrictions, analyze and cue movement patterns, analyze and modify body mechanics/ergonomics, assess and modify postural abnormalities and instruct in home and community integration to attain remaining goals.    Progress toward goals / Updated goals:    See ie     PLAN  []  Upgrade activities as tolerated yes Continue plan of care   []  Discharge due to :    []  Other:      Therapist: Micki New, PT    Date: 8/6/2018 Time: 10:00 AM     Future Appointments  Date Time Provider Shavonne Mortensen   8/9/2018 4:00 PM Maura Conklin [facility]   8/14/2018 3:00 PM Maura oCnklin,  [facility]   8/16/2018 3:00 PM Maura Conklin,  [facility]   8/20/2018 4:00 PM Maura Conklin,  [facility]   8/23/2018 5:30 PM Maura Rubin,  [facility]   8/27/2018 4:00 PM Maura Conklin,  [facility]

## 2018-08-06 NOTE — PROGRESS NOTES
OLEGARIO Baez5 [facility]  PHYSICAL THERAPY  [address] [place] [address],[facility], 70 [facility] - Phone: [phone]  Fax: [phone] / 3144 [facility]  Patient Name: Juli Kay : 1955   Medical   Diagnosis: S/p L SAD Treatment Diagnosis: Left shoulder pain [M25.512]   Onset Date: 18     Referral Source: Wilfrido Loo MD Start of Care [facility]): 2018   Prior Hospitalization: See medical history Provider #: [id number]   Prior Level of Function: Limited functional use of shoulder    Comorbidities: High blood pressure, high cholesterol   Medications: Verified on Patient Summary List   The Plan of Care and following information is based on the information from the initial evaluation.   ===========================================================================================  Assessment / key information:  65 RHD M arrives to clinic s/p L shoulder arthroscopy (acromioplasty, bursectomy, SAD, debridement) due to chronic pain. I worked with Mr. Gonzalez Muhammad prior to surgery and aware of his pmf and plof. Since surgery pt reports relatively sedentary use of shoulder due to pain (9/10 with lifting overhead and 0/10 with rest). Denies using meds. Objective measurements: shoulder arom/prom flex 121/144P!, scaption 85/93P!, er 10/15P!, IR l4 spinal level (comprabable to R shoulder), strength reduced in all directions with resisted flexion/er painful, +painful arc, mckeon sakina and apprehension test. He has very poor scapular stability with inability to contract low or mid traps. ttp throughout SAS, LHB and medial deltoid.  Will attempt PT in order to address problem list below in order to restore pt overall function.   ===========================================================================================  Eval Complexity: History MEDIUM  Complexity : 1-2 comorbidities / personal factors will impact the outcome/ POC ; Examination  HIGH Complexity : 4+ Standardized tests and measures addressing body structure, function, activity limitation and / or participation in recreation ; Presentation LOW Complexity : Stable, uncomplicated ;  Decision Making MEDIUM Complexity : FOTO score of 26-74; Overall Complexity LOW   Problem List: pain affecting function, decrease ROM, decrease strength, decrease ADL/ functional abilitiies, decrease activity tolerance and decrease flexibility/ joint mobility    Treatment Plan may include any combination of the following: Therapeutic exercise, Therapeutic activities, Neuromuscular re-education, Physical agent/modality, Manual therapy, Patient education, Self Care training and Functional mobility training  Patient / Family readiness to learn indicated by: asking questions, trying to perform skills and interest  Persons(s) to be included in education: patient (P)  Barriers to Learning/Limitations: None  Measures taken:    Patient Goal (s): Decrease pain, restore normal use of shoulder   Patient self reported health status: good  Rehabilitation Potential: good   Short Term Goals: To be accomplished in  2  weeks:  1. Pt will be compliant with hep  2. Pt will perform prone scapular exercises to recruit low and middle trapezius without cues in order to stabilize shoulder during reaching  3. Pt will note daily max pain </=6/10 in order to increase participation in [address]: To be accomplished in  4  weeks:  1. Pt will increase shoulder elevation 140 to A with reaching   2. Pt will increase FOTO by 15 points in order to show functional improvement   3.  Pt will note daily max pain </=3/10 in order to increase participation in adls  Frequency / Duration:   Patient to be seen  2-3  times per week for 4  weeks:  Patient / Caregiver education and instruction: self care, activity modification and exercises  G-Codes (GP): na  Therapist Signature: Pau Beasley DPT, [facility] Date: 8/6/2018 Certification Period: na Time: 10:09 AM   ===========================================================================================  I certify that the above Physical Therapy Services are being furnished while the patient is under my care. I agree with the treatment plan and certify that this therapy is necessary. Physician Signature:        Date:       Time:     Please sign and return to [facility] [place]. or you may fax the signed copy to [phone]. Thank you.

## 2018-08-14 ENCOUNTER — APPOINTMENT (OUTPATIENT)
Dept: PHYSICAL THERAPY | Age: 63
End: 2018-08-14
Payer: OTHER GOVERNMENT

## 2018-08-16 ENCOUNTER — APPOINTMENT (OUTPATIENT)
Dept: PHYSICAL THERAPY | Age: 63
End: 2018-08-16
Payer: OTHER GOVERNMENT

## 2018-08-20 ENCOUNTER — APPOINTMENT (OUTPATIENT)
Dept: PHYSICAL THERAPY | Age: 63
End: 2018-08-20
Payer: OTHER GOVERNMENT

## 2018-08-23 ENCOUNTER — APPOINTMENT (OUTPATIENT)
Dept: PHYSICAL THERAPY | Age: 63
End: 2018-08-23
Payer: OTHER GOVERNMENT

## 2018-08-27 ENCOUNTER — APPOINTMENT (OUTPATIENT)
Dept: PHYSICAL THERAPY | Age: 63
End: 2018-08-27
Payer: OTHER GOVERNMENT

## 2018-09-10 NOTE — PROGRESS NOTES
. 
 
[facility] [address] [facility] THERAPY 
[address] [place], [place] [address],[facility], 70 [facility] - Phone: [phone]  Fax: [phone] DISCHARGE NOTE Patient Name: Nicole Whaley : 1955 Treatment/Medical Diagnosis: Left shoulder pain [M25.512] Referral Source: Kristian Cannon MD    
Date of Initial Visit: 18 Attended Visits: 1 Missed Visits: all SUMMARY OF TREATMENT Pt attended only initial evaluation and     0     follow-ups and then did not return. Therefore a formal reassessment of goals was not performed. RECOMMENDATIONS Discontinue physical therapy due to patient not returning. If you have any questions/comments please contact us directly at [phone]. Thank you for allowing us to assist in the care of your patient. Therapist Signature: Ewelina Hurt DPT, [facility]  Date: 9/10/18   Time: 9:43 AM

## 2019-02-04 ENCOUNTER — IMPORTED ENCOUNTER (OUTPATIENT)
Dept: URBAN - METROPOLITAN AREA CLINIC 1 | Facility: CLINIC | Age: 64
End: 2019-02-04

## 2019-02-04 PROBLEM — H04.123: Noted: 2019-02-04

## 2019-02-04 PROBLEM — H43.813: Noted: 2019-02-04

## 2019-02-04 PROBLEM — H25.813: Noted: 2019-02-04

## 2019-02-04 PROBLEM — H43.812: Noted: 2019-02-04

## 2019-02-04 PROCEDURE — 92015 DETERMINE REFRACTIVE STATE: CPT

## 2019-02-04 PROCEDURE — 92014 COMPRE OPH EXAM EST PT 1/>: CPT

## 2019-02-04 NOTE — PATIENT DISCUSSION
1.  Cataract OU: Observe for now without intervention. The patient was advised to contact us if any change or worsening of vision2. Dry Eyes OU - Use ATs TID OU Routinely. 3.  PVD w/o Tear OU- RD precautions. Letter to PCP Return for an appointment in 1 yr 27 with Dr. Irene Sarkar.

## 2019-08-08 NOTE — PROCEDURE NOTE: SURGICAL
<p>Prior to commencing surgery patient identification, surgical procedure, site, and side were confirmed by Dr. Julio Smith. Following topical proparacaine anesthesia, the patient was positioned at the YAG laser, a contact lens coupled to the cornea with methylcellulose and an axial posterior capsulotomy performed without complication using 3.6 Mj x 42. Excess methylcellulose was washed from the eye, one drop of Alphagan was instilled and the patient returned to the holding area having tolerated the procedure well and without complication. </p><p>MRN:[id number]</p>

## 2020-02-04 ENCOUNTER — IMPORTED ENCOUNTER (OUTPATIENT)
Dept: URBAN - METROPOLITAN AREA CLINIC 1 | Facility: CLINIC | Age: 65
End: 2020-02-04

## 2020-02-04 PROBLEM — H25.813: Noted: 2020-02-04

## 2020-02-04 PROBLEM — H04.123: Noted: 2020-02-04

## 2020-02-04 PROBLEM — H43.813: Noted: 2020-02-04

## 2020-02-04 PROCEDURE — 92014 COMPRE OPH EXAM EST PT 1/>: CPT

## 2020-02-04 NOTE — PATIENT DISCUSSION
1.  Cataract OU: Observe for now without intervention. The patient was advised to contact us if any change or worsening of vision2. Dry Eyes OU - Increase the use of At's to BID OU routinely 3. PVD OU - RD precautions. Patient deferred Manifest Rx today. Return for an appointment in 1 year 27 with Dr. Ana M Steiner. Return for an appointment in 40 PRN with Dr. Ana M Steiner.

## 2020-08-29 NOTE — PROGRESS NOTES
"47 yo CM with PMF of HTN, RAZA presents with chest pain.  Occurring intermittently over the past month, with worsening frequency and severity  Now accompanied by syncopal events  CP described as L sided, squeezing pressure  Radiates to jaw and L arm  Then pt "blacks out" for a few seconds  Also experiences tongue biting and numbness around his mouth  +L arm numbness and weakness  No confusion before or after  No SOB, no palpitations, no edema  +intermittent slurred speech, no dysphagia  He has been reluctant to stop driving despite syncopal episodes  10 days ago was seen by OSH ED with nl head CT  However his CP continued to reoccur with syncopal episodes  He spoke to his cardiologist who said to come here for admission    Pt also reports he's been seen recently by his PCP for:  Thyroid disorder  Strep throat and sinusitis  Low testosterone  Possible AV suresh blockage (per pt's report)  However he is on metoprolol  And he says he was given Celebrex for chest pain  Also with history of costochondritis  However he says this current squeezing pressure chest pain is new and different to previous pain  " Renny Ledbetter PHYSICAL THERAPY - DAILY TREATMENT NOTE    Patient Name: Jessi Jerome        Date: 2018  : 1955   yes Patient  Verified  Visit #:   10   of   12  Insurance: Payor: Will Wright / Plan: Lolita Chavez / Product Type: Commerical /      In time: 225 Out time: 310   Total Treatment Time: 45     Medicare Time Tracking (below)   Total Timed Codes (min):  na 1:1 Treatment Time:  na     TREATMENT AREA =  Left shoulder pain [M25.512]    SUBJECTIVE  Pain Level (on 0 to 10 scale):  10   Medication Changes/New allergies or changes in medical history, any new surgeries or procedures?    no  If yes, update Summary List   Subjective Functional Status/Changes:  []  No changes reported   I am feeling much better overall I just have theses minor twinges when I try to reach or lift up     OBJECTIVE      30 min Therapeutic Exercise:  [x]  See flow sheet   Rationale:      increase ROM and improve coordination to improve the patients ability to complete adls     15 min Manual Therapy: ghj prom all directions, grade iii post mob, cfm/stretch lhb, dtm post cuff and pec major   Rationale:      decrease pain, increase ROM, increase tissue extensibility and decrease trigger points to improve patient's ability to complete adls         min Patient Education:  yes  Reviewed HEP   []  Progressed/Changed HEP based on:         Other Objective/Functional Measures:  After 3 minutes on UBE pt reported an increase in SAS that resolved after rest. Still continues with inflammation in sas   Able to perform shoulder stab te <90 decrees with min c/o     Post Treatment Pain Level (on 0 to 10) scale:   1  / 10     ASSESSMENT  Assessment/Changes in Function:     Denies pain during RTC <90 degrees but still continues with limited adl and rotational adl s     []  See Progress Note/Recertification   Patient will continue to benefit from skilled PT services to modify and progress therapeutic interventions, address functional mobility deficits, address ROM deficits, address strength deficits, analyze and address soft tissue restrictions, analyze and cue movement patterns, analyze and modify body mechanics/ergonomics, assess and modify postural abnormalities and instruct in home and community integration to attain remaining goals.    Progress toward goals / Updated goals:  Continuing strengthening exercises to address adl limitation     PLAN  []  Upgrade activities as tolerated yes Continue plan of care   []  Discharge due to :    []  Other:      Therapist: Too Marcano PT    Date: 4/11/2018 Time: 11:39 AM     Future Appointments  Date Time Provider Shavonne Mortensen   4/17/2018 2:30 PM Too Marcano, PT [facility]   4/20/2018 8:30 AM Too Marcano, PT [facility]   4/24/2018 3:00 PM Too Marcano, PT [facility]   4/26/2018 5:00 PM [address] [place] [address]   5/1/2018 4:30 PM Too Marcano PT [facility]   5/3/2018 4:00 PM Too Marcano, PT [facility]   5/8/2018 3:30 PM Too Marcano, PT [facility]   5/10/2018 4:30 PM Too Marcano, PT [facility]   5/14/2018 3:30 PM Too Marcano, PT [facility]   5/16/2018 3:00 PM Too Marcano, PT [facility]

## 2020-12-18 ENCOUNTER — IMPORTED ENCOUNTER (OUTPATIENT)
Dept: URBAN - METROPOLITAN AREA CLINIC 1 | Facility: CLINIC | Age: 65
End: 2020-12-18

## 2020-12-18 PROBLEM — H52.223: Noted: 2020-12-18

## 2020-12-18 PROBLEM — H52.13: Noted: 2020-12-18

## 2020-12-18 PROBLEM — H52.4: Noted: 2020-12-18

## 2020-12-18 PROCEDURE — S0621 ROUTINE OPHTHALMOLOGICAL EXA: HCPCS

## 2020-12-18 NOTE — PATIENT DISCUSSION
1. Myopia w/ Astigmatism OU -- Rx was given for correction if indicated and requested. 2. Presbyopia 3. Dry Eyes OU -- Cont the use of ATs TID OU routinely. 4.  Cataracts OU -- Observe. 5.  PVD w/o Tear OU -- Stable. RD Precautions. Return for an appointment as scheduled (2.4.2021 - 30) with Dr. Tosha Galan. Return for an appointment in 1 year 36 with Dr. Tosha Galan.

## 2021-02-04 ENCOUNTER — IMPORTED ENCOUNTER (OUTPATIENT)
Dept: URBAN - METROPOLITAN AREA CLINIC 1 | Facility: CLINIC | Age: 66
End: 2021-02-04

## 2021-02-04 PROBLEM — H43.813: Noted: 2021-02-04

## 2021-02-04 PROBLEM — H04.123: Noted: 2021-02-04

## 2021-02-04 PROBLEM — H25.813: Noted: 2021-02-04

## 2021-02-04 PROCEDURE — 99214 OFFICE O/P EST MOD 30 MIN: CPT

## 2021-02-04 NOTE — PATIENT DISCUSSION
1.  Cataract OU -- Non-surgical at this time continue to monitor for progression. The patient was advised to contact us if any change or worsening of vision2. Dry Eyes OU -- Cont the use of ATs TID OU routinely. 3.  PVD w/o Tear OU -- Stable. RD Precautions. Patient deferred MRx returns under vision plan. Return for an appointment in December 40 with Dr. Sonny Lomas. Return for an appointment in 1 year 30/Glare with Dr. Sonny Lomas.

## 2022-04-02 ASSESSMENT — TONOMETRY
OD_IOP_MMHG: 18
OS_IOP_MMHG: 21
OS_IOP_MMHG: 19
OS_IOP_MMHG: 19
OD_IOP_MMHG: 20
OD_IOP_MMHG: 17
OS_IOP_MMHG: 18
OD_IOP_MMHG: 18

## 2022-04-02 ASSESSMENT — VISUAL ACUITY
OD_SC: 20/20
OS_CC: J1
OD_CC: J1
OD_SC: 20/20
OS_SC: 20/20
OS_CC: J1
OS_SC: 20/30
OS_SC: 20/20
OS_SC: 20/20
OD_CC: J1
OD_SC: 20/20
OS_CC: J1
OD_CC: J1
OD_SC: 20/20

## 2022-06-07 ENCOUNTER — COMPREHENSIVE EXAM (OUTPATIENT)
Dept: URBAN - METROPOLITAN AREA CLINIC 1 | Facility: CLINIC | Age: 67
End: 2022-06-07

## 2022-06-07 DIAGNOSIS — H25.813: ICD-10-CM

## 2022-06-07 DIAGNOSIS — H16.143: ICD-10-CM

## 2022-06-07 DIAGNOSIS — H04.123: ICD-10-CM

## 2022-06-07 DIAGNOSIS — H43.813: ICD-10-CM

## 2022-06-07 PROCEDURE — 92014 COMPRE OPH EXAM EST PT 1/>: CPT

## 2022-06-07 PROCEDURE — 92015 DETERMINE REFRACTIVE STATE: CPT

## 2022-06-07 ASSESSMENT — VISUAL ACUITY
OS_CC: 20/20-2
OS_BAT: 20/30
OD_CC: 20/20-2
OD_BAT: 20/30

## 2025-04-15 NOTE — PROGRESS NOTES
[facility] PHYSICAL THERAPY - DAILY TREATMENT NOTE    Patient Name: Andrzej Larios        Date: 2018  : 1955   yes Patient  Verified  Visit #:     Insurance: Payor: Laya Sage / Plan: Alicia De La Cruz / Product Type: Commerical /      In time: 120 Out time: 755   Total Treatment Time: 38     Medicare Time Tracking (below)   Total Timed Codes (min):  na 1:1 Treatment Time:  na     TREATMENT AREA =  Left shoulder pain [M25.512]    SUBJECTIVE  Pain Level (on 0 to 10 scale): 2  / 10   Medication Changes/New allergies or changes in medical history, any new surgeries or procedures?    no  If yes, update Summary List   Subjective Functional Status/Changes:  []  No changes reported   I am feeling much better overall I just have theses minor twinges when I try to reach or lift up     OBJECTIVE      23 min Therapeutic Exercise:  [x]  See flow sheet   Rationale:      increase ROM and improve coordination to improve the patients ability to complete adls     15 min Manual Therapy: ghj prom all directions, grade iii post mob, cfm/stretch lhb, dtm post cuff and pec major   Rationale:      decrease pain, increase ROM, increase tissue extensibility and decrease trigger points to improve patient's ability to complete adls         min Patient Education:  yes  Reviewed HEP   []  Progressed/Changed HEP based on: Other Objective/Functional Measures:   resumed and progressed te as per flow sheet - requires constant cues for firing pattern of roxy-scapular region   ttp along medial deltoid.       Post Treatment Pain Level (on 0 to 10) scale:   1  / 10     ASSESSMENT  Assessment/Changes in Function:     Denies pain during RTC <90 degrees but still continues with limited adl and rotational adl s     []  See Progress Note/Recertification   Patient will continue to benefit from skilled PT services to modify and progress therapeutic interventions, address functional mobility deficits, address ROM deficits, address strength deficits, analyze and address soft tissue restrictions, analyze and cue movement patterns, analyze and modify body mechanics/ergonomics, assess and modify postural abnormalities and instruct in home and community integration to attain remaining goals. Progress toward goals / Updated goals:  Pt resuming PT after nearly month off to improve overhead mobility and strength.      PLAN  []  Upgrade activities as tolerated yes Continue plan of care   []  Discharge due to :    []  Other:      Therapist: Jose Britt PT    Date: 4/9/2018 Time: 11:39 AM     Future Appointments  Date Time Provider Shavonne Mortensen   4/11/2018 2:30 PM Gridley Balwinder, PT [facility]   4/17/2018 2:30 PM Gridley Balwinder, PT [facility]   4/20/2018 8:30 AM Jose Balwinder, PT [facility]   4/24/2018 3:00 PM Gridley Balwinder, PT [facility]   4/26/2018 5:00 PM [address] [place] [address]   5/1/2018 4:30 PM Gridley Balwinder, PT [facility]   5/3/2018 4:00 PM Gridley Balwinder, PT [facility]   5/8/2018 3:30 PM Jose Balwinder, PT [facility]   5/10/2018 4:30 PM Jose Balwinder, PT [facility]   5/14/2018 3:30 PM Gridley Balwinder, PT [facility]   5/16/2018 3:00 PM Jose Balwinder, PT [facility]